# Patient Record
(demographics unavailable — no encounter records)

---

## 2025-01-17 NOTE — PHYSICAL EXAM
[Well Developed] : well developed [Well Nourished] : well nourished [No Acute Distress] : no acute distress [Normal Conjunctiva] : normal conjunctiva [Normal Venous Pressure] : normal venous pressure [No Carotid Bruit] : no carotid bruit [Normal S1, S2] : normal S1, S2 [No Rub] : no rub [No Gallop] : no gallop [Murmur] : murmur [Clear Lung Fields] : clear lung fields [Good Air Entry] : good air entry [No Respiratory Distress] : no respiratory distress  [Soft] : abdomen soft [Non Tender] : non-tender [No Masses/organomegaly] : no masses/organomegaly [Normal Gait] : normal gait [No Edema] : no edema [No Cyanosis] : no cyanosis [No Clubbing] : no clubbing [No Rash] : no rash [No Skin Lesions] : no skin lesions [Moves all extremities] : moves all extremities [No Focal Deficits] : no focal deficits [Normal Speech] : normal speech [Alert and Oriented] : alert and oriented [Normal memory] : normal memory [de-identified] : Regular rhythm, grade 1/6 to 2/6 systolic murmur best heard at the lower left sternal border

## 2025-01-17 NOTE — HISTORY OF PRESENT ILLNESS
[FreeTextEntry1] :   (Most recent complaint has been some chronic constipation over the past year for which she is undergoing GI evaluation but does have a history of diverticular disease;  His most recent lipid profile was very favorable and demonstrated a total cholesterol of 125, HDL equals 65, triglycerides equal 62, and LDL cholesterol equals 46;  Carotid duplex study performed July 22, 2024 demonstrated bilateral mild heterogeneous plaquing in the proximal vessels in the range of 20 to 49% without any significant obstructive flow;  Transthoracic echocardiogram from 7/22/2024 demonstrated borderline LVH pattern with preserved LV systolic function LVEF in the range of 59%.  There was trace to mild MR, trace TR, and small membranous ventricular septal defect with left-to-right shunt.  No pericardial effusion normal aortic dimensions;     Transthoracic Echocardiogram (1/27/2023):  Normal LV systolic and diastolic function with LVEF 60 to 65%.  There was no regional wall motion abnormalities.  Left and right atria are normal in size and structure.  Trace MR and mild TR.  No evidence of pericardial effusion.     Pharmacologic Nuclear Stress Test (03/17/2023):  Patient developed SOB during exam that resolved with rest.  Patient developed no dysrhythmias during exercise. The EKG was negative for ischemia. Normal Sestamibi myocardial perfusion imaging with artifact. LV function was hyperdynamic with LVEF of 75%; ie- negative study;

## 2025-01-17 NOTE — REASON FOR VISIT
98.3 [FreeTextEntry1] : The patient is a 76-year-old gentleman, who had really no prior significant cardiac history, who returns to the office for cardiac follow-up;  He has a history of atherosclerotic heart disease that was actually seen on a CT calcium score last year; at that time he was recommended to have a cardiac evaluation by his PCP (Dr. Aparicio) after undergoing a CT Calcium score which was 789.8.  The majority of the burden was in the LAD vessel at 337 which was moderate to severe; additionally, he was found to have mild carotid artery disease bilaterally, small VSD, and heart murmur;  Nuclear stress test from March 17, 2023-which was negative for ischemia on the pharmacologic protocol without any evidence of significant CAD; aggressive medical therapy was recommended;  He returns to the office today for general cardiac checkup; he is accompanied with his wife today;  (He was cardiac cleared last April 2024 for TURP surgery with Dr. Humza Alas at [facility]).    Patient remains asymptomatic for chest pain, shortness of breath, palpitations, or dizziness.

## 2025-01-17 NOTE — ASSESSMENT
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; LAD; LAFB;   No acute changes;-  In summary this 76-year-old gentleman has a history for diffuse abnormal calcium CT score in the 700s predominantly affecting the LAD however patient has been asymptomatic from the cardiac standpoint and his last nuclear stress test March 2023, was negative for ischemia on the myocardial perfusion images with pharmacologic protocol; Aggressive medical management was recommended and patient seems to be doing very well with his most recent lipid panel showing very favorable LDL at 46;  More recently found to have a small membranous VSD on echo and mild bilateral carotid plaquing stenosis;  He has done reasonably well and gets occasional exertional dyspnea but has some additional somatic complaints such as some GI distress and some small inguinal hernias.  Concerned about his cardiac risk factors and history of atherosclerotic coronary disease;   PLAN:   1).  Recommend continue current medical regimen and enteric-coated aspirin 81 mg daily;  2).  Have recommended scheduling nuclear stress test sometime in the early spring to rule out any significant coronary ischemia;  3).  Patient to report any untoward chest symptoms etc. between now and next visit;  4)-return to office within 4 to 5 months or as needed;
Sub acute rehab. However patient prefers d/c home. If patient d/c home,would recommend home with Home PT and assist for all functional mobility./Sub-acute Rehab

## 2025-06-13 NOTE — REASON FOR VISIT
[FreeTextEntry1] : The patient is a 77-year-old gentleman, who had really no prior significant cardiac history, who returns to the office for cardiac follow-up;  He has a history of atherosclerotic heart disease that was actually seen on a CT calcium score back in January 2023; at that time he was recommended to have a cardiac evaluation by his PCP (Dr. Aparicio) after undergoing a CT Calcium score which was 789.8.  The majority of the burden was in the LAD vessel at 337 which was moderate to severe; additionally, he was found to have mild carotid artery disease bilaterally, small VSD, and heart murmur;  Nuclear stress test from March 17, 2023-which was negative for ischemia on the pharmacologic protocol without any evidence of significant CAD; aggressive medical therapy was recommended;  He returns to the office today for general cardiac checkup; he is accompanied with his wife today.   (He was cardiac cleared last April 2024 for TURP surgery with Dr. Humza Alas at [facility]).    Patient remains asymptomatic for chest pain, shortness of breath, palpitations, or dizziness.

## 2025-06-13 NOTE — ADDENDUM
[FreeTextEntry1] : Agree with the above assessment and plan. Aggressive risk factor modification, goal LDL < 70

## 2025-06-13 NOTE — HISTORY OF PRESENT ILLNESS
[FreeTextEntry1] : We had recommended a short follow up which included repeating a pharmacologic nuclear stress test to compare to his prior study back in 2023.  He's back today to review those results as well.  Nuclear Pharmacologic Nuclear Stress Test (May 29, 2025):  Normal myocardial perfusion scan, with no evidence for infarction or inducible ischemia.  Post stress LVEF is normal at 65%.  No ischemic ST segment changes.  There is no significant interval change when compared to prior study dated (3/17/2023); ie- negative study.    (Most recent complaint has been some chronic constipation over the past year for which she is undergoing GI evaluation but does have a history of diverticular disease;  His most recent lipid profile was very favorable and demonstrated a total cholesterol of 125, HDL equals 65, triglycerides equal 62, and LDL cholesterol equals 46;  Carotid duplex study performed July 22, 2024 demonstrated bilateral mild heterogeneous plaquing in the proximal vessels in the range of 20 to 49% without any significant obstructive flow;  Transthoracic echocardiogram from 7/22/2024 demonstrated borderline LVH pattern with preserved LV systolic function LVEF in the range of 59%.  There was trace to mild MR, trace TR, and small membranous ventricular septal defect with left-to-right shunt.  No pericardial effusion normal aortic dimensions;

## 2025-06-13 NOTE — PHYSICAL EXAM
[Well Developed] : well developed [Well Nourished] : well nourished [No Acute Distress] : no acute distress [Normal Conjunctiva] : normal conjunctiva [Normal Venous Pressure] : normal venous pressure [No Carotid Bruit] : no carotid bruit [Normal S1, S2] : normal S1, S2 [No Rub] : no rub [No Gallop] : no gallop [Murmur] : murmur [Clear Lung Fields] : clear lung fields [Good Air Entry] : good air entry [No Respiratory Distress] : no respiratory distress  [Soft] : abdomen soft [Non Tender] : non-tender [No Masses/organomegaly] : no masses/organomegaly [Normal Gait] : normal gait [No Edema] : no edema [No Cyanosis] : no cyanosis [No Clubbing] : no clubbing [No Rash] : no rash [No Skin Lesions] : no skin lesions [Moves all extremities] : moves all extremities [No Focal Deficits] : no focal deficits [Normal Speech] : normal speech [Alert and Oriented] : alert and oriented [Normal memory] : normal memory [de-identified] : Regular rhythm, grade 1/6 to 2/6 systolic murmur best heard at the lower left sternal border